# Patient Record
Sex: FEMALE
[De-identification: names, ages, dates, MRNs, and addresses within clinical notes are randomized per-mention and may not be internally consistent; named-entity substitution may affect disease eponyms.]

---

## 2018-07-10 ENCOUNTER — HOSPITAL ENCOUNTER (OUTPATIENT)
Dept: HOSPITAL 53 - M LAB | Age: 23
End: 2018-07-10
Attending: NURSE PRACTITIONER

## 2018-07-10 DIAGNOSIS — Z02.89: Primary | ICD-10-CM

## 2018-07-11 LAB — HERPES ZOSTER, VARICELLA IGG: 1520 INDEX

## 2018-10-01 ENCOUNTER — HOSPITAL ENCOUNTER (EMERGENCY)
Dept: HOSPITAL 53 - M ED | Age: 23
Discharge: HOME | End: 2018-10-01
Payer: COMMERCIAL

## 2018-10-01 DIAGNOSIS — N39.0: Primary | ICD-10-CM

## 2018-10-01 LAB
CONTROL LINE UCG: (no result)
LEUKOCYTE ESTERASE UR AUTO RFX: (no result)
SPECIFIC GRAVITY UR AUTO RFX: 1.03 (ref 1–1.03)
SQUAM EPITHELIAL CELL UR AURFX: 1 /HPF (ref 0–6)
URINE PREG TEST: NEGATIVE
YEAST LIKE CELL URINE AUTO RFX: (no result)

## 2018-10-01 PROCEDURE — 84703 CHORIONIC GONADOTROPIN ASSAY: CPT

## 2018-10-01 RX ADMIN — NITROFURANTOIN (MONOHYDRATE/MACROCRYSTALS) 1 MG: 75; 25 CAPSULE ORAL at 22:50

## 2018-12-05 ENCOUNTER — HOSPITAL ENCOUNTER (OUTPATIENT)
Dept: HOSPITAL 53 - M SFHCLERA | Age: 23
End: 2018-12-05
Attending: FAMILY MEDICINE
Payer: COMMERCIAL

## 2018-12-05 DIAGNOSIS — Z13.1: ICD-10-CM

## 2018-12-05 DIAGNOSIS — Z11.3: Primary | ICD-10-CM

## 2018-12-05 LAB
CHLAMYDIA DNA AMPLIFICATION: NEGATIVE
EST. AVERAGE GLUCOSE BLD GHB EST-MCNC: 94 MG/DL (ref 60–110)
GC DNA AMPLIFICATION: NEGATIVE

## 2018-12-07 LAB
HIV 1&2 SCREEN CENTAUR: NEGATIVE
SYPHILIS: NONREACTIVE

## 2021-02-27 ENCOUNTER — HOSPITAL ENCOUNTER (OUTPATIENT)
Dept: HOSPITAL 53 - M LABSMTC | Age: 26
End: 2021-02-27
Attending: ANESTHESIOLOGY
Payer: COMMERCIAL

## 2021-02-27 DIAGNOSIS — Z20.822: ICD-10-CM

## 2021-02-27 DIAGNOSIS — Z01.812: Primary | ICD-10-CM

## 2021-03-04 ENCOUNTER — HOSPITAL ENCOUNTER (OUTPATIENT)
Dept: HOSPITAL 53 - M SDC | Age: 26
Discharge: HOME | End: 2021-03-04
Attending: OTOLARYNGOLOGY
Payer: COMMERCIAL

## 2021-03-04 VITALS — WEIGHT: 104 LBS | BODY MASS INDEX: 20.96 KG/M2 | HEIGHT: 59 IN

## 2021-03-04 VITALS — SYSTOLIC BLOOD PRESSURE: 99 MMHG | DIASTOLIC BLOOD PRESSURE: 63 MMHG

## 2021-03-04 DIAGNOSIS — R19.6: ICD-10-CM

## 2021-03-04 DIAGNOSIS — F90.9: ICD-10-CM

## 2021-03-04 DIAGNOSIS — R13.10: ICD-10-CM

## 2021-03-04 DIAGNOSIS — Z79.899: ICD-10-CM

## 2021-03-04 DIAGNOSIS — J35.1: Primary | ICD-10-CM

## 2021-03-04 PROCEDURE — 88302 TISSUE EXAM BY PATHOLOGIST: CPT

## 2021-03-04 PROCEDURE — 42826 REMOVAL OF TONSILS: CPT

## 2021-03-04 PROCEDURE — 81025 URINE PREGNANCY TEST: CPT

## 2021-03-04 NOTE — RO
OPERATIVE NOTE



DATE OF OPERATION: 03/04/2021



PREOPERATIVE DIAGNOSES:

1.  Tonsillar hypertrophy.

2.  Halitosis.

3.  Dysphagia.



POSTOPERATIVE DIAGNOSES:

1.  Tonsillar hypertrophy.

2.  Halitosis.

3.  Dysphagia.



PROCEDURE PERFORMED: Tonsillectomy. 



ANESTHESIA: General. 



CLINICAL PREAMBLE: This is a 25-year-old woman presented to the office

complaining of halitosis and dysphagia. Physical examination revealed cryptic

and enlarged tonsils. Management options including tonsillectomy have been

discussed with the patient. She understood and consented to the procedure. 



DESCRIPTION OF PROCEDURE:  Patient was identified in preoperative holding and

brought to the operating room in stable condition. In supine position on the

operating table, patient received general anesthesia followed by orotracheal

intubation without incident. Patient was prepped and draped in the usual

fashion for the procedure. The Rubi-Heriberto mouth gag was inserted and suspended.



The right tonsil was medialized using curved Allis forceps. Mucosal incision

was made over the superior pole of the right tonsil using the Coblator wand set

at 7 for Coblation. The tonsillar capsule was identified, and dissection was

carried out along this plane to excise the right tonsil. The left tonsil was

then similarly dissected out. At the end of the procedure, both tonsil beds

were free of bleeding.



Estimated blood loss was less than 10 mL. No complication was encountered.

Sponge and instrument counts were correct at the end of the procedure. General

anesthesia was reversed, and patient was extubated and brought to the recovery

room in stable condition.

## 2021-11-11 ENCOUNTER — HOSPITAL ENCOUNTER (EMERGENCY)
Dept: HOSPITAL 53 - M ED | Age: 26
Discharge: HOME | End: 2021-11-11
Payer: COMMERCIAL

## 2021-11-11 VITALS — DIASTOLIC BLOOD PRESSURE: 63 MMHG | SYSTOLIC BLOOD PRESSURE: 105 MMHG

## 2021-11-11 VITALS — HEIGHT: 59 IN | WEIGHT: 105.82 LBS | BODY MASS INDEX: 21.33 KG/M2

## 2021-11-11 DIAGNOSIS — U07.1: Primary | ICD-10-CM

## 2021-11-11 DIAGNOSIS — J30.89: ICD-10-CM

## 2021-11-11 LAB
BASOPHILS # BLD AUTO: 0 10^3/UL (ref 0–0.2)
BASOPHILS NFR BLD AUTO: 0.4 % (ref 0–1)
EOSINOPHIL # BLD AUTO: 0 10^3/UL (ref 0–0.5)
EOSINOPHIL NFR BLD AUTO: 0 % (ref 0–3)
HCT VFR BLD AUTO: 41 % (ref 36–47)
HGB BLD-MCNC: 13 G/DL (ref 12–15.5)
LYMPHOCYTES # BLD AUTO: 1.1 10^3/UL (ref 1.5–5)
LYMPHOCYTES NFR BLD AUTO: 39.1 % (ref 24–44)
MCH RBC QN AUTO: 26.3 PG (ref 27–33)
MCHC RBC AUTO-ENTMCNC: 31.7 G/DL (ref 32–36.5)
MCV RBC AUTO: 83 FL (ref 80–96)
MONOCYTES # BLD AUTO: 0.3 10^3/UL (ref 0–0.8)
MONOCYTES NFR BLD AUTO: 12.2 % (ref 2–8)
NEUTROPHILS # BLD AUTO: 1.3 10^3/UL (ref 1.5–8.5)
NEUTROPHILS NFR BLD AUTO: 47.9 % (ref 36–66)
PLATELET # BLD AUTO: 237 10^3/UL (ref 150–450)
RBC # BLD AUTO: 4.94 10^6/UL (ref 4–5.4)
WBC # BLD AUTO: 2.7 10^3/UL (ref 4–10)

## 2021-11-11 NOTE — CCD
Summarization Of Episode

                             Created on: 2021



QASIM JONES

External Reference #: [id number]

: 1995

Sex: Female



Demographics





                          Address                   [address] [place] [address]  [address]

 

                          Home Phone                [phone]

 

                          Preferred Language        English

 

                          Marital Status            Unknown

 

                          Worship Affiliation     Unknown

 

                          Race                      White

 

                          Ethnic Group              Not  or 





Author





                          Author                    [facility]

 

                          Organization              [facility]

 

                          Address                   Unknown

 

                          Phone                     Unavailable







Support





                Name            Relationship    Address         Phone

 

                    CONNOR CARLOS  Next Of Kin         [address] [place] [address] 3

[address] [place] [address]  [address]               [phone]

 

                    [facility] Next Of Kin         [address]

[address] [place] 

[place]  [address]                    [phone]

 

                    [facility] Next Of Kin         [address]

[address] 

[place]  [address]                    [phone]

 

                    [facility]          Next Of Kin         [address] 

[place]  [address]                    [phone]

 

                UE              Next Of Kin     Unknown         Unavailable

 

                CHILD           Next Of Kin     Unknown         [phone]

 

                    ANT HAYES     Next Of Kin         [address] [place] [address]  [address]                    [phone]

 

                    Connor Carlos  ECON                [address] [place] [address]  [address]               Unavailable

 

                    ANT HAYES     ECON                [address]  [address]                    Unavailable







Care Team Providers





                    Care Team Member Name Role                Phone

 

                    Feola, T Mariluz PA   Unavailable         Unavailable

 

                    Feola, T Mariluz PA   Unavailable         Unavailable

 

                    Feola, T Mariluz PA   Unavailable         Unavailable

 

                    Feola, T Mariluz PA   Unavailable         Unavailable

 

                    Feola, T Mariluz PA   Unavailable         Unavailable

 

                    Feola, T Mariluz PA   Unavailable         Unavailable

 

                    Feola, T Mariluz PA   Unavailable         Unavailable

 

                    Feola, T Mariluz PA   Unavailable         Unavailable

 

                    Feola, T Mariluz PA   Unavailable         Unavailable

 

                    Feola, T Mariluz PA   Unavailable         Unavailable

 

                    Feola, T Mariluz PA   Unavailable         Unavailable

 

                    Feola, T Mariluz PA   Unavailable         Unavailable

 

                    Feola, T Mariluz PA   Unavailable         Unavailable

 

                    Feola, T Mariluz PA   Unavailable         Unavailable

 

                    Feola, T Mariluz PA   Unavailable         Unavailable

 

                    Feola, T Mariluz PA   Unavailable         Unavailable

 

                    Feola, T Mariluz PA   Unavailable         Unavailable

 

                    Feola, T Mariluz PA   Unavailable         Unavailable

 

                    Feola, T Mariluz PA   Unavailable         Unavailable

 

                    Feola, T Mariluz PA   Unavailable         Unavailable

 

                    Feola, T Mariluz PA   Unavailable         Unavailable

 

                    Feola, T Mariluz PA   Unavailable         Unavailable

 

                    Feola, T Mariluz PA   Unavailable         Unavailable

 

                    Feola, T Mariluz PA   Unavailable         Unavailable

 

                    Feola, T Mariluz PA   Unavailable         Unavailable

 

                    Feola, T Mariluz PA   Unavailable         Unavailable

 

                    Feola, T Mariluz PA   Unavailable         Unavailable

 

                    Feola, T Mariluz PA   Unavailable         Unavailable

 

                    Feola, T Mariluz PA   Unavailable         Unavailable

 

                    Feola, T Mariluz PA   Unavailable         Unavailable

 

                    Feola, T Mariluz PA   Unavailable         Unavailable

 

                    Feola, T Mariluz PA   Unavailable         Unavailable

 

                    Feola, T Mariluz PA   Unavailable         Unavailable

 

                    Feola, T Mariluz PA   Unavailable         Unavailable

 

                    Feola, T Mariluz PA   Unavailable         Unavailable

 

                    Feola, T Mariluz PA   Unavailable         Unavailable

 

                    Feola, T Mariluz PA   Unavailable         Unavailable

 

                    Feola, T Mariluz PA   Unavailable         Unavailable

 

                    Feola, T Mariluz PA   Unavailable         Unavailable

 

                    Feola, T Mariluz PA   Unavailable         Unavailable

 

                    Feola, T Mariluz PA   Unavailable         Unavailable

 

                    LORETTA MARIA MD   Unavailable         Unavailable

 

                    LORETTA MARIA MD   Unavailable         Unavailable

 

                    LORETTA MARIA MD   Unavailable         Unavailable

 

                    LORETTA MARIA MD   Unavailable         Unavailable

 

                    LORETTA MARIA MD   Unavailable         Unavailable

 

                    LORETTA MARIA MD   Unavailable         Unavailable

 

                    LORETTA MARIA MD   Unavailable         Unavailable

 

                    LORETTA MARIA MD   Unavailable         Unavailable

 

                    LORETTA MARIA MD   Unavailable         Unavailable

 

                    LORETTA MARIA MD   Unavailable         Unavailable

 

                    LORETTA MARIA MD   Unavailable         Unavailable

 

                    LORETTA MARIA MD   Unavailable         Unavailable

 

                    LORETTA MARIA MD   Unavailable         Unavailable

 

                    LORETTA MARIA MD   Unavailable         Unavailable

 

                    LORETTA MARIA MD   Unavailable         Unavailable

 

                    LORETTA MARIA MD   Unavailable         Unavailable

 

                    LORETTA MARIA MD   Unavailable         Unavailable

 

                    LORETTA MARIA MD   Unavailable         Unavailable

 

                    LORETTA MARIA MD   Unavailable         Unavailable

 

                    LORETTA MARIA MD   Unavailable         Unavailable

 

                    LORETTA MARIA MD   Unavailable         Unavailable

 

                    LORETTA MARIA MD   Unavailable         Unavailable

 

                    LORETTA MARIA MD   Unavailable         Unavailable

 

                    LORETTA MARIA MD   Unavailable         Unavailable

 

                    LORETTA MARIA MD   Unavailable         Unavailable

 

                    LORETTA MARIA MD   Unavailable         Unavailable

 

                    LORETTA MARIA MD   Unavailable         Unavailable

 

                    LORETTA MARIA MD   Unavailable         Unavailable

 

                    LORETTA MARIA MD   Unavailable         Unavailable

 

                    LORETTA MARIA MD   Unavailable         Unavailable

 

                    LORETTA MARIA MD   Unavailable         Unavailable

 

                    LORETTA MARIA MD   Unavailable         Unavailable

 

                    LORETTA MARIA MD   Unavailable         Unavailable

 

                    LORETTA MARIA MD   Unavailable         Unavailable



                                  



Re-disclosure Warning

          The records that you are about to access may contain information from 
federally-assisted alcohol or drug abuse programs. If such information is 
present, then the following federally mandated warning applies: This information
has been disclosed to you from records protected by federal confidentiality 
rules (42 CFR part 2). The federal rules prohibit you from making any further 
disclosure of this information unless further disclosure is expressly permitted 
by the written consent of the person to whom it pertains or as otherwise 
permitted by 42 CFR part 2. A general authorization for the release of medical 
or other information is NOT sufficient for this purpose. The Federal rules 
restrict any use of the information to criminally investigate or prosecute any 
alcohol or drug abuse patient.The records that you are about to access may 
contain highly sensitive health information, the redisclosure of which is 
protected by Article 27-F of the [facility] Public Health law. If you 
continue you may have access to information: Regarding HIV / AIDS; Provided by 
facilities licensed or operated by the [facility] Office of Mental Health; 
or Provided by the [facility] Office for People With Developmental 
Disabilities. If such information is present, then the following [place] 
mandated warning applies: This information has been disclosed to you from 
confidential records which are protected by state law. State law prohibits you 
from making any further disclosure of this information without the specific 
written consent of the person to whom it pertains, or as otherwise permitted by 
law. Any unauthorized further disclosure in violation of state law may result in
a fine or [facility] sentence or both. A general authorization for the release of 
medical or other information is NOT sufficient authorization for further disc
losure.                                                                         
    



Family History

          



             Family Member Name Family Member Gender Family Member Status Date o

f Status 

Description                             Data Source(s)

 

           Unknown    Unknown    Problem                          [facility] ([facility]



                                                                                
       



Encounters

          



           Encounter  Providers  Location   Date       Indications Data Source(s

)

 

                Unknown                         [address] [facility] [address]

[place] [address] 2021 12:00:00 AM 

EDT                                                 eCW1 ([facility])

 

                Outpatient                      [address] [facility] [address]

[place] [address] 2021 12:00:00 AM

EDT                                                 eCW1 ([facility])

 

                Outpatient                      [address] [facility]

Y [address] 2021 12:00:00 AM

EDT                                                 eCW1 ([facility])

 

                Outpatient                      [address] [facility]

[place] [address] 2021 12:00:00 AM

EDT                                                 eCW1 ([facility])

 

                Unknown                         [address] [facility]

Y [address] 03/10/2021 12:00:00 AM 

EST                                                 eCW1 ([facility])

 

                Unknown                         [address] [facility]

Y [address] 03/10/2021 12:00:00 AM 

EST                                                 eCW1 ([facility])

 

                Outpatient                      [address] [facility]

[place] [address] 2021 12:00:00 AM

EST                                                 eCW1 ([facility])

 

                Outpatient      Attender: Mariluz GALEAS                 

021 09:34:34 AM EST - 2021 

10:28:55 AM EST                                     [facility] ([facility]

)

 

                Outpatient      Attender: BROOKS Maria/Erlin/Nikko/Darien grant 2021 08:45:00

AM EST                                              [facility] ([facility]

 

                Unknown                         [address] [facility]

Y [address] 12/10/2020 12:00:00 AM 

EST                                                 eCW1 ([facility])

 

                Outpatient                      [address] [facility]

Y [address] 2020 12:00:00 AM

EST                                                 eCW1 ([facility])

 

                Unknown                         [address] [facility]

Y [address] 2020 12:00:00 AM 

EST                                                 eCW1 ([facility])



                                                                                
                                                                                
                                    



Medications

          



          Medication Brand Name Start Date Product Form Dose      Route     Admi

nistrative 

Instructions Pharmacy Instructions Status     Indications Reaction   Description

 Data 

Source(s)

 

                                        21 DAY Ethinyl Estradiol 0.046793 MG/HR 

/ Etonogestrel 0.005 MG/HR Vaginal Ring 

[NuvaRing] NuvaRing 0.12-0.015 MG/24HR NuvaRing 0.12-0.015 MG/24HR 2021 

12:00:00 AM EDT                                    active               NuvaRing

 0.12-0.015 MG/24HR eCW1 ([facility])

 

                                        21 DAY Ethinyl Estradiol 0.534801 MG/HR 

/ Etonogestrel 0.005 MG/HR Vaginal Ring 

[NuvaRing] NuvaRing 0.12-0.015 MG/24HR NuvaRing 0.12-0.015 MG/24HR 2021 

12:00:00 AM EDT                                    active               NuvaRing

 0.12-0.015 MG/24HR eCW1 ([facility])

 

          5 mg/5 mL           2021 12:00:00 AM EDT solution  60           

       TAKE 5ML BY MOUTH EVERY 6 

HOURS AS NEEDED FOR SEVERE PAIN MAXIMUM DAILY DOSE = 20ML TAKE 5ML BY MOUTH 

EVERY 6 HOURS AS NEEDED FOR SEVERE PAIN MAXIMUM DAILY DOSE = 20ML SOLD: 

2021                                                      [facility]

 

           Ibuprofen 20 MG/ML Oral Suspension Ibuprofen  2021 12:00:00 AM 

EST                       ORAL

                              completed                               [facility] ([facility], )

 

                          Acetaminophen 65 MG/ML / Oxycodone Hydrochloride 1 MG/

ML Oral Solution 

Oxycodone-Acetaminophen 2021 12:00:00 AM EST             ORAL             

 completed                   

[facility] ([facility], )



                                                                                
                            



Insurance Providers

          



             Payer name   Policy type / Coverage type Policy ID    Covered party

 ID Covered 

party's relationship to vila Policy Vila             Plan Information

 

          [place]   [employer] [id number]           Self         

       [id number]

 

          [place]   [employer] [id number]           Self         

       [id number]

 

          [facility]           [id number]           SP           

       [id number]

 

          [facility] [employer] [facility]           [id number]           SP           

       [id number]

 

                    [employer] [id number]          

                     

[id number]

 

                    [employer] [id number]          

                     

[id number]

 

          [facility] COMMUNITY PLAN [facility]           [id number]                      

       [id number]

 

                    [employer]-Medicaid [id number]          

                     

[id number]

 

            [facility] [facility]           [id number]           [id number]          

       [id number]

 

           [employer] [place]           [id number]           18   

               [id number]

 

          [employer] [place]           [id number]           SP                  7

[id number]

 

          [facility] Commercial           [id number]     Self               

  

 

          [facility]             [id number]                             [id number]

[phone]



                                                                                
                                                                                
                                                



Problems, Conditions, and Diagnoses

          No Information                                                        
                                



Surgeries/Procedures

          



             Procedure    Description  Date         Indications  Data Source(s)

 

             URINE PREGNANCY TEST              2021 12:00:00 AM EDT       

       eCW1 ([facility])



                                                                                
        



Results

          



                    ID                  Date                Data Source

 

                    [id number]         2021 09:18:00 AM EST [facility] ([facility], )









          Name      Value     Range     Interpretation Code Description Data Dory

rce(s) Supporting 

Document(s)

 

           Surgical pathology study Laboratory test result                      

            [facility] ([facility], )                            

 

                                        FINAL DIAGNOSIS



AB - Tonsils, right and left, tonsillectomy:

Oropharyngeal mucosa with lymphoid follicular hyperplasia.

No evidence for malignancy.

                                        2021 - 



CLINICAL DIAGNOSIS



Tonsillar hypertrophy, halitosis and dysphagia

                                        2021 - 



GROSS DIAGNOSIS



A - Received in formalin labeled "right tonsil" and consists of a

fragment of tonsillar tissue measuring 2.3 x 1.5 x 0.7 cm.  Sectioning

shows tan lobulated tissue with no gross apparent mass lesions.

Representative section in one block.



B - Received in formalin labeled "left tonsil" and consists of a

fragment of tonsillar tissue measuring 2.0 x 1.5 x 0.5 cm.  Sectioning

shows tan lobulated tissue with no gross apparent mass lesions.

Representative section in one block.

                                        -SV

                                        2021 - 



Signed________ WILLIE TOTH MD 2021 [id number]

 









                    ID                  Date                Data Source

 

                    [id number]         2021 08:30:00 AM EST [facility]









          Name      Value     Range     Interpretation Code Description Data Dory

rce(s) Supporting 

Document(s)

 

          SARS coronavirus 2 RNA Not Detected                               [facility]

[place]     

 

                                        This lab was ordered by [facility] and reported by [facility]. 









                    ID                  Date                Data Source

 

                    [id number]            2021 12:00:00 AM EST [facility]









          Name      Value     Range     Interpretation Code Description Data Dory

rce(s) Supporting 

Document(s)

 

                                        SARS coronavirus 2 RNA [Presence] in Res

piratory specimen by JENNIFER with probe 

detection  NEGATIVE                                    [facility]      

 

                                        This lab was ordered by [facility] and reported by [facility]. 









                    ID                  Date                Data Source

 

                    [id number]       2021 12:00:00 AM EST [facility]









          Name      Value     Range     Interpretation Code Description Data Dory

rce(s) Supporting 

Document(s)

 

          Carestart Rapid COVID Antigen Test Negative                           

     [facility]     

 

                                        This lab was reported by [facility]. 







                                        Procedure

 

                                          



                                                                                
                                                



Social History

          



           Code       Duration   Value      Status     Description Data Source(s

)

 

           Smoking    2021 12:00:00 AM EDT Never Smoker completed  Never S

moker eCW1 

([facility])

 

           Smoking    2021 12:00:00 AM EDT Never Smoker completed  Never S

moker eCW1 

([facility])

 

           Smoking    2021 12:00:00 AM EDT Never Smoker completed  Never S

moker eCW1 

([facility])

 

           Smoking    2021 12:00:00 AM EDT Never Smoker completed  Never S

moker eCW1 

([facility])

 

           Smoking    2021 12:00:00 AM EST Never Smoker completed  Never S

moker eCW1 

([facility])

 

           Smoking    2021 12:00:00 AM EST Never Smoker completed  Never S

moker eCW1 

([facility])

 

           Smoking    2021 12:00:00 AM EST Never Smoker completed  Never S

moker eCW1 

([facility])

 

           Smoking    2020 12:00:00 AM EST Never Smoker completed  Never S

moker eCW1 

([facility])

 

           Smoking    2020 12:00:00 AM EST Never Smoker completed  Never S

moker eCW1 

([facility])

 

           Smoking    2020 12:00:00 AM EST Never Smoker completed  Never S

moker eCW1 

([facility])



                                                                                
                                                                                
                            



Vital Signs

          



                    ID                  Date                Data Source

 

                    UNK                                      









           Name       Value      Range      Interpretation Code Description Data

 Source(s)

 

           Diastolic blood pressure 60 mm[Hg]                        60 mm[Hg]  

eCW1 ([facility])

 

           Systolic blood pressure 98 mm[Hg]                        98 mm[Hg]  e

CW1 ([facility])

 

           Body weight 103.4 [lb_av]                       103.4 [lb_av] eCW1 ([facility])

 

           Body height 60 [in_i]                        60 [in_i]  eCW1 ([facility])

 

           Body mass index (BMI) [Ratio] 20.19 kg/m2                       20.19

 kg/m2 eCW1 ([facility])

 

           Body weight 103 [lb_av]                       103 [lb_av] eCW1 ([facility])

 

           Body weight 46.72 kg                         46.72 kg   eCW1 ([facility])

 

           Body height 60 [in_i]                        60 [in_i]  eCW1 ([facility])

 

           Body mass index (BMI) [Ratio] 20.11 kg/m2                       20.11

 kg/m2 eCW1 ([facility])

 

           Systolic blood pressure 102 mm[Hg]                       102 mm[Hg] e

CW1 ([facility])

 

           Diastolic blood pressure 64 mm[Hg]                        64 mm[Hg]  

eCW1 ([facility])

 

           Body weight 104.0 [lb_av]                       104.0 [lb_av] eCW1 ([facility])

 

           Body weight 47.17 kg                         47.17 kg   eCW1 ([facility])

 

           Body height 60 [in_i]                        60 [in_i]  eCW1 ([facility])

 

           Body mass index (BMI) [Ratio] 20.31 kg/m2                       20.31

 kg/m2 eCW1 ([facility])

 

           Systolic blood pressure 100 mm[Hg]                       100 mm[Hg] e

CW1 ([facility])

 

           Diastolic blood pressure 60 mm[Hg]                        60 mm[Hg]  

eCW1 ([facility])

 

           Body height 59 [in_i]                        59 [in_i]  ZEFERINO ([facility]

[facility])

 

                                        4'" 

 

           Body weight 100.00 [lb_av]                       100.00 [lb_av] MEDEN

T ([facility])

 

           Body mass index (BMI) [Ratio] 20.2 kg/m2                       20.2 k

g/m2 [facility] ([facility])

 

           Ideal body weight 100 [lb_av]                       100 [lb_av] MEDEN

T ([facility])

 

           Body weight 45.360 kg                        45.360 kg  MEDENT ([facility])

 

           Body surface area Derived from formula 1.37 m2                       

   1.37 m2    [facility] ([facility])

 

           Body height 59 [in_i]                        59 [in_i]  MEDENT ([facility])

 

                                        4'" 

 

           Body weight 100.25 [lb_av]                       100.25 [lb_av] MEDEN

T ([facility])

 

           Body mass index (BMI) [Ratio] 20.2 kg/m2                       20.2 k

g/m2 [facility] ([facility])

 

           Ideal body weight 100 [lb_av]                       100 [lb_av] MEDEN

T ([facility])

 

           Body weight 45.473 kg                        45.473 kg  MEDENT ([facility])

 

           Body surface area Derived from formula 1.38 m2                       

   1.38 m2    [facility] ([facility])

 

           Body weight 104 [lb_av]                       104 [lb_av] eCW1 ([facility])

 

           Body height 60 [in_i]                        60 [in_i]  eCW1 ([facility])

 

           Body mass index (BMI) [Ratio] 20.31 kg/m2                       20.31

 kg/m2 eCW1 ([facility])

 

           Heart rate 98 /min                          98 /min    eCW1 ([facility])

 

           Respiratory rate 18 /min                          18 /min    eCW1 ([facility])

 

           Body temperature 99.3 [degF]                       99.3 [degF] eCW1 (

[facility])

 

           Systolic blood pressure 112 mm[Hg]                       112 mm[Hg] e

CW1 ([facility])

 

           Diastolic blood pressure 70 mm[Hg]                        70 mm[Hg]  

eCW1 ([facility])

 

           Body height 59 [in_i]                        59 [in_i]  MEDENT ([facility])

 

                                        4'11" 

 

           Body weight 106.00 [lb_av]                       106.00 [lb_av] MEDEN

T ([facility])

 

           Body mass index (BMI) [Ratio] 21.4 kg/m2                       21.4 k

g/m2 [facility] ([facility])

 

           Ideal body weight 100 [lb_av]                       100 [lb_av] MEDEN

T ([facility])

 

           Body weight 48.082 kg                        48.082 kg  [facility] ([facility])

 

           Body surface area Derived from formula 1.41 m2                       

   1.41 m2    [facility] ([facility])

 

           Body weight 101 [lb_av]                       101 [lb_av] eCW1 ([facility])

 

           Body height 60 [in_i]                        60 [in_i]  eCW1 ([facility])

 

           Body mass index (BMI) [Ratio] 19.72 kg/m2                       19.72

 kg/m2 eCW1 ([facility])

 

           Heart rate 98 /min                          98 /min    eCW1 ([facility])

 

           Respiratory rate 18 /min                          18 /min    eCW1 ([facility])

 

           Body temperature 98.4 [degF]                       98.4 [degF] eCW1 (

[facility])

 

           Systolic blood pressure 101 mm[Hg]                       101 mm[Hg] e

CW1 ([facility])

 

           Diastolic blood pressure 69 mm[Hg]                        69 mm[Hg]  

eCW1 ([facility])



                                                                                
                  



Patient Treatment Plan of Care

          



             Planned Activity Planned Date Details      Description  Data Source

(s)

 

                                        21 DAY Ethinyl Estradiol 0.321473 MG/HR 

/ Etonogestrel 0.005 MG/HR Vaginal Ring 

[NuvaRing]      2021 12:00:00 AM EDT                                 eCW1 

([facility])

 

                                        21 DAY Ethinyl Estradiol [id number] MG/HR 

/ Etonogestrel 0.005 MG/HR Vaginal Ring 

[NuvaRing]      2021 12:00:00 AM EDT                                 eCW1 

([facility])

## 2021-11-11 NOTE — REP
INDICATION:

cough, SOB.



COMPARISON:

None.



TECHNIQUE:

Two views of the chest were obtained.



FINDINGS:

No acute parenchymal opacification or effusion is identified however peribronchial

cuffing is noted in both hilar regions more pronounced on the left.  The heart and

great vessels appear normal.



IMPRESSION:

Peribronchial cuffing only as above





<Electronically signed by Chris rBandon > 11/11/21 [id number]